# Patient Record
Sex: MALE | Race: WHITE | NOT HISPANIC OR LATINO | URBAN - METROPOLITAN AREA
[De-identification: names, ages, dates, MRNs, and addresses within clinical notes are randomized per-mention and may not be internally consistent; named-entity substitution may affect disease eponyms.]

---

## 2021-01-22 ENCOUNTER — OUT OF OFFICE VISIT (OUTPATIENT)
Dept: URBAN - METROPOLITAN AREA MEDICAL CENTER 10 | Facility: MEDICAL CENTER | Age: 61
End: 2021-01-22
Payer: COMMERCIAL

## 2021-01-22 DIAGNOSIS — R19.7 ACUTE DIARRHEA: ICD-10-CM

## 2021-01-22 DIAGNOSIS — R10.11 ABDOMINAL BURNING SENSATION IN RIGHT UPPER QUADRANT: ICD-10-CM

## 2021-01-22 DIAGNOSIS — R93.3 ABN FINDINGS-GI TRACT: ICD-10-CM

## 2021-01-22 DIAGNOSIS — R11.2 ACUTE NAUSEA WITH NONBILIOUS VOMITING: ICD-10-CM

## 2021-01-22 PROCEDURE — 99254 IP/OBS CNSLTJ NEW/EST MOD 60: CPT | Performed by: INTERNAL MEDICINE

## 2021-02-01 ENCOUNTER — OFFICE VISIT (OUTPATIENT)
Dept: URBAN - METROPOLITAN AREA CLINIC 78 | Facility: CLINIC | Age: 61
End: 2021-02-01
Payer: COMMERCIAL

## 2021-02-01 ENCOUNTER — DASHBOARD ENCOUNTERS (OUTPATIENT)
Age: 61
End: 2021-02-01

## 2021-02-01 DIAGNOSIS — R19.7 DIARRHEA: ICD-10-CM

## 2021-02-01 DIAGNOSIS — Z09 HOSPITAL DISCHARGE FOLLOW-UP: ICD-10-CM

## 2021-02-01 DIAGNOSIS — E65 CENTRAL OBESITY: ICD-10-CM

## 2021-02-01 DIAGNOSIS — K21.9 GERD (GASTROESOPHAGEAL REFLUX DISEASE): ICD-10-CM

## 2021-02-01 DIAGNOSIS — Z12.11 SCREEN FOR COLON CANCER: ICD-10-CM

## 2021-02-01 DIAGNOSIS — K57.90 DIVERTICULOSIS COLI: ICD-10-CM

## 2021-02-01 DIAGNOSIS — G47.30 SLEEP APNEA IN ADULT: ICD-10-CM

## 2021-02-01 DIAGNOSIS — K56.7 ILEUS, UNSPECIFIED: ICD-10-CM

## 2021-02-01 DIAGNOSIS — I88.0 MESENTERIC ADENITIS: ICD-10-CM

## 2021-02-01 DIAGNOSIS — K90.89 BILE SALT-INDUCED DIARRHEA: ICD-10-CM

## 2021-02-01 PROBLEM — 248311001 CENTRAL OBESITY: Status: ACTIVE | Noted: 2021-02-01

## 2021-02-01 PROBLEM — 197476001 INTESTINAL MALABSORPTION: Status: ACTIVE | Noted: 2021-02-01

## 2021-02-01 PROBLEM — 235595009 GASTROESOPHAGEAL REFLUX DISEASE: Status: ACTIVE | Noted: 2021-02-01

## 2021-02-01 PROBLEM — 398050005 DIVERTICULAR DISEASE OF COLON: Status: ACTIVE | Noted: 2021-02-01

## 2021-02-01 PROBLEM — 73430006 SLEEP APNEA: Status: ACTIVE | Noted: 2021-02-01

## 2021-02-01 PROCEDURE — G8482 FLU IMMUNIZE ORDER/ADMIN: HCPCS | Performed by: INTERNAL MEDICINE

## 2021-02-01 PROCEDURE — 99214 OFFICE O/P EST MOD 30 MIN: CPT | Performed by: INTERNAL MEDICINE

## 2021-02-01 RX ORDER — SODIUM, POTASSIUM,MAG SULFATES 17.5-3.13G
177 ML DAY 1 AND 177 ML DAY 2 SOLUTION, RECONSTITUTED, ORAL ORAL
Qty: 354 MILLILITER | Refills: 0 | OUTPATIENT
Start: 2021-02-01

## 2021-02-01 NOTE — HPI-TODAY'S VISIT:
Patient has personal hx of Sleep apnea, central obesity , GERD and Hyperlipedemia .Patient is  seen in follow up  after recent hospitalization for Nausea/vomiting and abdominal pain .  He was admitted and was observed with dx of small bowel Ileus  which resolved spontaneously . Patient has personal hx of Cholecytectomy in 2010 and has bile salt diarrhea well managed by Cholestyramine   since d/c he is doing better  N/V and abdominal pain has resolved  1/19/2021  Sodium Level 137 mmol/L  Potassium Level 3.9 mmol/L  Chloride Level 101 mmol/L  Carbon Dioxide Level 26 mmol/L  AG 10 mmol/L  Osmolality, Calculated 279 mOsm/kg  Glucose 117 mg/dL  HI  Blood Urea Nitrogen 21 mg/dL  Creatinine 0.88 mg/dL  U:C 24 Ratio  HI  Estimated GFR, Non African American 92 mL/min/1.73m2  Estimated GFR, African American 106 mL/min/1.73m2  Protein Total 7.3 gm/dL  Albumin Level 4.3 gm/dL  Bilirubin Total 1.4 mg/dL  HI  Calcium Level Total 8.5 mg/dL  LOW  Alanine Aminotransferase 32 unit/L  Alkaline Phosphatase 59 unit/L  Aspartate Aminotransferase 19 unit/L  Lipase 5 unit/L  LOW  White Blood Count 9.00 10E3/mcL  Red Blood Cell Count 5.17 10E6/mcL  Hemoglobin 14.9 gm/dL  Hematocrit 46.2 %  MCV 89.4 fL  MCH 28.8 pg  MCHC 32.3 gm/dL  Red Cell Distribution Width-CV 12.8 %  Red Cell Distribution Width-SD 42.1 fL  Auto Nucleated Red Cell Count 0 %  NA  Platelet Count 220 10E3/mcL  Stool studies are neg for C.diff  CoVID 19 PCR neg      CT scan IMPRESSION:  1. Fluid-filled dilated small bowel loops throughout the abdomen without definite transition point identified. Small bowel loops transition to more normal distal ileum within the right mid abdomen, as above. Findings could represent adynamic ileus or early obstruction. Correlate clinically.  2. Mild ground glass opacity within the central mesentery extending to the left abdomen with subcentimeter lymph nodes, possibly evidence of mild mesenteric adenitis or mild panniculitis. This can be a source of pain. Correlate clinically.  3. Elevated left hemidiaphragm with bibasilar consolidation, left greater than right. Findings within the left lung base presumably relate to compressive atelectasis.

## 2021-03-19 ENCOUNTER — OFFICE VISIT (OUTPATIENT)
Dept: URBAN - METROPOLITAN AREA SURGERY CENTER 15 | Facility: SURGERY CENTER | Age: 61
End: 2021-03-19

## 2021-04-23 ENCOUNTER — OFFICE VISIT (OUTPATIENT)
Dept: URBAN - METROPOLITAN AREA SURGERY CENTER 15 | Facility: SURGERY CENTER | Age: 61
End: 2021-04-23

## 2021-05-03 ENCOUNTER — OFFICE VISIT (OUTPATIENT)
Dept: URBAN - METROPOLITAN AREA MEDICAL CENTER 10 | Facility: MEDICAL CENTER | Age: 61
End: 2021-05-03

## 2021-05-17 PROBLEM — 275978004 SCREENING FOR MALIGNANT NEOPLASM OF COLON: Status: ACTIVE | Noted: 2021-05-17

## 2021-10-04 ENCOUNTER — OFFICE VISIT (OUTPATIENT)
Dept: URBAN - METROPOLITAN AREA MEDICAL CENTER 10 | Facility: MEDICAL CENTER | Age: 61
End: 2021-10-04
Payer: COMMERCIAL

## 2021-10-04 DIAGNOSIS — K63.89 BACTERIAL OVERGROWTH SYNDROME: ICD-10-CM

## 2021-10-04 DIAGNOSIS — D12.5 ADENOMA OF SIGMOID COLON: ICD-10-CM

## 2021-10-04 DIAGNOSIS — Z12.11 AVERAGE RISK FOR CRC. DUE TO PT'S CO-MORBID STATE WITH END STAGE DEMENTIA, HIGH RISK FOR ANESTHESIA (PER NEUROLOGY); INABILITY TO TAKE A BOWEL PREP....WOULD NOT ADVISE ANY COLORECTAL CANCER SCREENING INCLUDING STOOL TEST FOR FECAL BLOOD.: ICD-10-CM

## 2021-10-04 PROCEDURE — 45388 COLONOSCOPY W/ABLATION: CPT | Performed by: INTERNAL MEDICINE

## 2021-10-04 PROCEDURE — 45385 COLONOSCOPY W/LESION REMOVAL: CPT | Performed by: INTERNAL MEDICINE

## 2021-10-04 RX ORDER — SODIUM, POTASSIUM,MAG SULFATES 17.5-3.13G
177 ML DAY 1 AND 177 ML DAY 2 SOLUTION, RECONSTITUTED, ORAL ORAL
Qty: 354 MILLILITER | Refills: 0 | Status: ACTIVE | COMMUNITY
Start: 2021-02-01